# Patient Record
Sex: MALE | Race: WHITE | NOT HISPANIC OR LATINO | Employment: FULL TIME | URBAN - METROPOLITAN AREA
[De-identification: names, ages, dates, MRNs, and addresses within clinical notes are randomized per-mention and may not be internally consistent; named-entity substitution may affect disease eponyms.]

---
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
## 2022-04-10 ENCOUNTER — HISTORICAL (OUTPATIENT)
Dept: ADMINISTRATIVE | Facility: HOSPITAL | Age: 22
End: 2022-04-10

## 2022-04-26 VITALS — OXYGEN SATURATION: 98 % | DIASTOLIC BLOOD PRESSURE: 67 MMHG | WEIGHT: 138.25 LBS | SYSTOLIC BLOOD PRESSURE: 110 MMHG

## 2022-09-21 ENCOUNTER — HISTORICAL (OUTPATIENT)
Dept: ADMINISTRATIVE | Facility: HOSPITAL | Age: 22
End: 2022-09-21

## 2023-07-21 DIAGNOSIS — I86.1 VARICOCELE: Primary | ICD-10-CM

## 2023-08-14 ENCOUNTER — OFFICE VISIT (OUTPATIENT)
Dept: UROLOGY | Facility: CLINIC | Age: 23
End: 2023-08-14
Payer: MEDICAID

## 2023-08-14 VITALS
WEIGHT: 154.38 LBS | DIASTOLIC BLOOD PRESSURE: 80 MMHG | HEIGHT: 68 IN | SYSTOLIC BLOOD PRESSURE: 131 MMHG | RESPIRATION RATE: 18 BRPM | OXYGEN SATURATION: 100 % | HEART RATE: 95 BPM | BODY MASS INDEX: 23.4 KG/M2

## 2023-08-14 DIAGNOSIS — N50.0 TESTICULAR ATROPHY: ICD-10-CM

## 2023-08-14 DIAGNOSIS — I86.1 VARICOCELE: Primary | ICD-10-CM

## 2023-08-14 PROBLEM — F32.9 MAJOR DEPRESSIVE DISORDER WITH SINGLE EPISODE: Status: ACTIVE | Noted: 2023-08-14

## 2023-08-14 PROBLEM — F90.9 ATTENTION DEFICIT HYPERACTIVITY DISORDER (ADHD): Status: ACTIVE | Noted: 2023-08-14

## 2023-08-14 PROBLEM — F41.0 PANIC ATTACK: Status: ACTIVE | Noted: 2023-08-14

## 2023-08-14 PROBLEM — F41.1 ANXIETY STATE: Status: ACTIVE | Noted: 2023-08-14

## 2023-08-14 LAB
BILIRUB SERPL-MCNC: NORMAL MG/DL
BLOOD URINE, POC: NORMAL
COLOR, POC UA: NORMAL
GLUCOSE UR QL STRIP: NORMAL
KETONES UR QL STRIP: NORMAL
LEUKOCYTE ESTERASE URINE, POC: NORMAL
NITRITE, POC UA: NORMAL
PH, POC UA: 8
PROTEIN, POC: NORMAL
SPECIFIC GRAVITY, POC UA: 1.02
UROBILINOGEN, POC UA: 0.2

## 2023-08-14 PROCEDURE — 3075F PR MOST RECENT SYSTOLIC BLOOD PRESS GE 130-139MM HG: ICD-10-PCS | Mod: CPTII,,, | Performed by: UROLOGY

## 2023-08-14 PROCEDURE — 99204 OFFICE O/P NEW MOD 45 MIN: CPT | Mod: S$PBB,,, | Performed by: UROLOGY

## 2023-08-14 PROCEDURE — 1159F MED LIST DOCD IN RCRD: CPT | Mod: CPTII,,, | Performed by: UROLOGY

## 2023-08-14 PROCEDURE — 1159F PR MEDICATION LIST DOCUMENTED IN MEDICAL RECORD: ICD-10-PCS | Mod: CPTII,,, | Performed by: UROLOGY

## 2023-08-14 PROCEDURE — 81001 URINALYSIS AUTO W/SCOPE: CPT | Mod: PBBFAC | Performed by: UROLOGY

## 2023-08-14 PROCEDURE — 3075F SYST BP GE 130 - 139MM HG: CPT | Mod: CPTII,,, | Performed by: UROLOGY

## 2023-08-14 PROCEDURE — 3008F BODY MASS INDEX DOCD: CPT | Mod: CPTII,,, | Performed by: UROLOGY

## 2023-08-14 PROCEDURE — 3079F PR MOST RECENT DIASTOLIC BLOOD PRESSURE 80-89 MM HG: ICD-10-PCS | Mod: CPTII,,, | Performed by: UROLOGY

## 2023-08-14 PROCEDURE — 3008F PR BODY MASS INDEX (BMI) DOCUMENTED: ICD-10-PCS | Mod: CPTII,,, | Performed by: UROLOGY

## 2023-08-14 PROCEDURE — 3079F DIAST BP 80-89 MM HG: CPT | Mod: CPTII,,, | Performed by: UROLOGY

## 2023-08-14 PROCEDURE — 99204 PR OFFICE/OUTPT VISIT, NEW, LEVL IV, 45-59 MIN: ICD-10-PCS | Mod: S$PBB,,, | Performed by: UROLOGY

## 2023-08-14 PROCEDURE — 99214 OFFICE O/P EST MOD 30 MIN: CPT | Mod: PBBFAC | Performed by: UROLOGY

## 2023-08-14 NOTE — PROGRESS NOTES
"CC:  Chief Complaint   Patient presents with    Evaluation for varicocele (No images in chart); Referral: 2     Evaluation for varicocele (No images in chart); Referral: [id number]       HPI:  Gera Aparicio is a 23 y.o. male here as a new patient for evaluation of varicocele.  Pt reports first onset 2020 that began as sudden pain in genital region. With dx of varicocele denies hx of torsion. Reports a constant dull discomfort to left testicle that increases to sharp pain when straining to lift.   Denies dysuria. Denies erectile dysfunction.   U/S on 7/26/23 left varicocele and left testicular atrophy.    ROS:  All systems reviewed and are negative except as documented in HPI and/or Assessment and Plan.     Patient Active Problem List:  ADHD  Anxiety      Past Medical History:  Past Medical History:   Diagnosis Date    ADHD     Varicocele         Past Surgical History:  Past Surgical History:   Procedure Laterality Date    TYMPANOSTOMY TUBE PLACEMENT          Family History  No family history on file.     Social History:  Social History     Socioeconomic History    Marital status: Single   Tobacco Use    Smoking status: Every Day     Current packs/day: 0.00     Types: Cigarettes, Vaping with nicotine    Smokeless tobacco: Never        Medications:  No current outpatient medications     Allergies:  Review of patient's allergies indicates:   Allergen Reactions    Codeine Hives and Rash     Other reaction(s): Hives        Objective:  Vitals:    08/14/23 1000   BP: 131/80   Pulse: 95   Resp: 18     General:  Well developed, well nourished adult male in no acute distress  Abdomen: Soft, nontender, no masses  Extremities:  No clubbing, cyanosis, or edema  Neurologic:  Grossly intact  Musculoskeletal:  Normal tone  Penis:  Circumcised, no lesions  Testicles:  Non-tender, asymmetric rt >left  Small varicocele palpated on left per Dr. Galaviz exam        Lab Results:  No results for input(s): "PSA" in the last 760 hours.  No " "results for input(s): "CBC" in the last 760 hours.   No results for input(s): "CREATININE" in the last 760 hours.   No results for input(s): "TESTOSTERONE" in the last 760 hours.   No results for input(s): "ESTRADIOL" in the last 760 hours.   Invalid input(s): "HEMATOCRIT"  Invalid input(s): "HEMOGLOBIN"   Invalid input(s): "URINE CULTURE"   Lab Results   Component Value Date    COLORU Dark Yellow 08/14/2023    SPECGRAV 1.025 08/14/2023    PHUR 8.0 08/14/2023    WBCUR neg 08/14/2023    NITRITE neg 08/14/2023    PROTEINPOC neg 08/14/2023    GLUCOSEUR neg 08/14/2023    KETONESU neg 08/14/2023    UROBILINOGEN 0.2 08/14/2023    BILIRUBINPOC neg 08/14/2023    RBCUR trace-intact 08/14/2023       Imaging:  Ultrasound outside facility:    FINDINGS: The right testicle measures 4.5 x 2.0 x 3.1 cm. A right  testicular mass is not appreciated. Right testicular blood flow is  within normal limits. The right epididymis is within normal limits. A  very small right hydrocele is present. The left testicle measures 3.6  x 1.8 x 2.5 cm. Left testicular echotexture is heterogeneous without a  focal mass. Mild left testicular hyperemia is appreciated relative to  the right testicle. The left epididymis is within normal limits. A  left varicocele is present.    IMPRESSION:   1. Left varicocele.  2. Left testicular atrophy with heterogeneous echotexture.  Asymmetrically increased left testicular blood flow suggestive of  acute orchitis.      Assessment:  Left Varicocele  Left Testicular Atrophy    Plan:    Discussed options for surgical intervention given risk to fertility and discomfort.  Pt elects for monitoring.  Close monitoring  RTC in 3 months with repeat U/S        Mouna Ariza M.D.  [facility]    "

## 2023-08-15 NOTE — PROGRESS NOTES
I saw and evaluated the patient with the resident.  I discussed with the resident and agree with the resident's history, physical, assessment, findings and care plan as documented in the resident's note.        Wandy Whitney DO  Urology  [facility]

## 2025-05-14 DIAGNOSIS — M54.9 SPINE PAIN: Primary | ICD-10-CM

## 2025-06-03 ENCOUNTER — HOSPITAL ENCOUNTER (OUTPATIENT)
Dept: RADIOLOGY | Facility: HOSPITAL | Age: 25
Discharge: HOME | End: 2025-06-03
Payer: COMMERCIAL

## 2025-06-03 DIAGNOSIS — M54.9 SPINE PAIN: ICD-10-CM

## 2025-06-03 PROCEDURE — 72040 X-RAY EXAM NECK SPINE 2-3 VW: CPT | Mod: TC

## 2025-06-03 PROCEDURE — 72070 X-RAY EXAM THORAC SPINE 2VWS: CPT | Mod: TC

## 2025-06-03 PROCEDURE — 72100 X-RAY EXAM L-S SPINE 2/3 VWS: CPT | Mod: TC
